# Patient Record
Sex: MALE | Race: WHITE
[De-identification: names, ages, dates, MRNs, and addresses within clinical notes are randomized per-mention and may not be internally consistent; named-entity substitution may affect disease eponyms.]

---

## 2021-12-11 ENCOUNTER — HOSPITAL ENCOUNTER (EMERGENCY)
Dept: HOSPITAL 11 - JP.ED | Age: 76
Discharge: HOME | End: 2021-12-11
Payer: MEDICARE

## 2021-12-11 VITALS — SYSTOLIC BLOOD PRESSURE: 149 MMHG | DIASTOLIC BLOOD PRESSURE: 92 MMHG | HEART RATE: 83 BPM

## 2021-12-11 DIAGNOSIS — D68.8: ICD-10-CM
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
## 2021-12-11 DIAGNOSIS — R53.1: ICD-10-CM

## 2021-12-11 DIAGNOSIS — Z88.8: ICD-10-CM

## 2021-12-11 DIAGNOSIS — Z79.01: ICD-10-CM

## 2021-12-11 DIAGNOSIS — S70.01XA: Primary | ICD-10-CM

## 2021-12-11 DIAGNOSIS — Z88.0: ICD-10-CM

## 2021-12-11 DIAGNOSIS — Z79.899: ICD-10-CM

## 2021-12-11 NOTE — EDM.PDOC
ED HPI GENERAL MEDICAL PROBLEM





- General


Chief Complaint: General


Stated Complaint: WEAKNESS


Time Seen by Provider: 12/11/21 09:50


Source of Information: Reports: Patient, Family


History Limitations: Reports: No Limitations





- History of Present Illness


INITIAL COMMENTS - FREE TEXT/NARRATIVE: 





76-year-old male who underwent a dental procedure 2 days ago, only with local 

anesthesia, who also has chronic Parkinson's is brought in today by his wife 

because of increased tremor, right hip discomfort after falling, and weakness.  

He also feels like his speech is not right and things just are not normal.  He 

did fall and bumped his head and is on Coumadin, his INR prior to the procedure 

was 3.7.  He ambulated several steps into the wheelchair to come in and look 

stable.  His vitals are normal.  He has no nausea and vomiting, no diarrhea, 

denies any pain other than his right hip.


Onset: Unknown/Unsure (Symptoms have kind of evolved over the past 2 days after 

his dental procedure)


Associated Symptoms: Reports: Confusion (Mild intermittent confusion), Headaches

(Mild intermittent headaches), Weakness (Especially lower extremities), Other 

(Increased tremor).  Denies: Cough, Fever/Chills, Nausea/Vomiting


  ** Right Hip


Pain Score (Numeric/FACES): 5





- Related Data


                                    Allergies











Allergy/AdvReac Type Severity Reaction Status Date / Time


 


Penicillins Allergy Intermediate Hives Verified 06/14/21 08:27


 


hydrochlorothiazide AdvReac  Cough Verified 06/14/21 08:27





[From Prinzide]     


 


lisinopril [From Prinzide] AdvReac  Cough Verified 06/14/21 08:27











Home Meds: 


                                    Home Meds





ARIPiprazole [Abilify] 15 mg PO BEDTIME 11/13/13 [History]


Omeprazole 20 mg PO DAILY 11/13/13 [History]


Warfarin Sliding Scale [Coumadin Sliding Scale] 8 mg PO BEDTIME 11/13/13 

[History]


Gabapentin [Neurontin] 600 mg PO BEDTIME 12/16/13 [History]


Naproxen 220 mg PO DAILY PRN 12/16/13 [History]


oxyCODONE HCl/Acetaminophen [oxyCODONE-Acetaminophen 5-325] 1 tab PO Q6H PRN 

12/16/13 [History]


Escitalopram Oxalate [Lexapro] 10 mg PO DAILY 06/14/21 [History]


NIFEdipine [Nifedipine ER] 60 mg PO DAILY 06/14/21 [History]


Carbidopa/Levodopa [Sinemet  mg Tablet]  12/11/21 [History]


Clindamycin HCl 300 mg PO 12/11/21 [History]


Pramipexole Di-HCl [Mirapex]  12/11/21 [History]











Social & Family History





- Tobacco Use


Tobacco Use Status *Q: Never Tobacco User


Second Hand Smoke Exposure: No





ED ROS GENERAL





- Review of Systems


Review Of Systems: See Below


Constitutional: Reports: Malaise.  Denies: Fever, Chills


HEENT: Denies: Vision Change


Respiratory: Denies: Shortness of Breath


Cardiovascular: Denies: Chest Pain


GI/Abdominal: Reports: No Symptoms


: Reports: No Symptoms


Musculoskeletal: Reports: Other (Right hip and groin pain after falling)


Skin: Denies: Bruising


Neurological: Reports: Confusion, Dizziness, Headache, Weakness, Other (Slight 

dysarthria or expressive aphasia with increased tremors)


Psychiatric: Reports: No Symptoms





ED EXAM, GENERAL





- Physical Exam


Exam: See Below


Exam Limited By: No Limitations


General Appearance: Alert, No Apparent Distress


Head: Atraumatic


Neck: Supple, Non-Tender


Respiratory/Chest: No Respiratory Distress, Lungs Clear


Cardiovascular: Regular Rate, Rhythm


GI/Abdominal: Soft, Non-Tender


Extremities: Other (Patient has some palpation tenderness over the lateral right

 hip and the right groin but minimal pain with passive range of motion including

 internal and external rotation.  He can actively flex his hip against gravity 

without much discomfort)


Neurological: Alert, Oriented, No Motor/Sensory Deficits


Psychiatric: Normal Affect, Normal Mood


Skin Exam: Warm, Dry





Course





- Vital Signs


Last Recorded V/S: 


                                Last Vital Signs











Temp  98.6 F   12/11/21 09:42


 


Pulse  83   12/11/21 11:14


 


Resp  16   12/11/21 09:42


 


BP  149/92 H  12/11/21 11:14


 


Pulse Ox  96   12/11/21 11:14














- Orders/Labs/Meds


Orders: 


                               Active Orders 24 hr











 Category Date Time Status


 


 Pelvis 1V or 2V [CR] Stat Exams  12/11/21 10:05 Taken











Labs: 


                                Laboratory Tests











  12/11/21 12/11/21 12/11/21 Range/Units





  10:05 10:55 10:55 


 


WBC   5.8   (4.5-11.0)  K/uL


 


RBC   5.41   (4.30-5.90)  M/uL


 


Hgb   16.3 H   (12.0-15.0)  g/dL


 


Hct   44.8   (40.0-54.0)  %


 


MCV   83   (80-98)  fL


 


MCH   30   (27-31)  pg


 


MCHC   36   (32-36)  %


 


Plt Count   112 L   (150-400)  K/uL


 


Neut % (Auto)   81.3 H   (36-66)  %


 


Lymph % (Auto)   7.9 L   (24-44)  %


 


Mono % (Auto)   9.6 H   (2-6)  %


 


Eos % (Auto)   0.7 L   (2-4)  %


 


Baso % (Auto)   0.5   (0-1)  %


 


PT    38.6 H  (9.2-10.6)  sec


 


INR    3.9  


 


Sodium     (140-148)  mmol/L


 


Potassium     (3.6-5.2)  mmol/L


 


Chloride     (100-108)  mmol/L


 


Carbon Dioxide     (21-32)  mmol/L


 


Anion Gap     (5.0-14.0)  mmol/L


 


BUN     (7-18)  mg/dL


 


Creatinine     (0.8-1.3)  mg/dL


 


Est Cr Clr Drug Dosing     mL/min


 


Estimated GFR (MDRD)     (>60)  


 


Glucose     ()  mg/dL


 


Calcium     (8.5-10.1)  mg/dL


 


Urine Color  Yellow    (YELLOW)  


 


Urine Appearance  Clear    (CLEAR)  


 


Urine pH  5.5    (5.0-8.0)  


 


Ur Specific Gravity  >= 1.030    (1.008-1.030)  


 


Urine Protein  100 H    (NEGATIVE)  mg/dL


 


Urine Glucose (UA)  500 H    (NEGATIVE)  mg/dL


 


Urine Ketones  15 H    (NEGATIVE)  mg/dL


 


Urine Occult Blood  Negative    (NEGATIVE)  


 


Urine Nitrite  Positive H    (NEGATIVE)  


 


Urine Bilirubin  Small H    (NEGATIVE)  


 


Urine Urobilinogen  0.2    (0.2-1.0)  EU/dL


 


Ur Leukocyte Esterase  Negative    (NEGATIVE)  


 


Urine RBC  Not seen    (0-5)  


 


Urine WBC  0-5    (0-5)  


 


Ur Epithelial Cells  Rare    


 


Amorphous Sediment  Few    


 


Urine Bacteria  Not seen    


 


Urine Mucus  Rare    














  12/11/21 Range/Units





  10:55 


 


WBC   (4.5-11.0)  K/uL


 


RBC   (4.30-5.90)  M/uL


 


Hgb   (12.0-15.0)  g/dL


 


Hct   (40.0-54.0)  %


 


MCV   (80-98)  fL


 


MCH   (27-31)  pg


 


MCHC   (32-36)  %


 


Plt Count   (150-400)  K/uL


 


Neut % (Auto)   (36-66)  %


 


Lymph % (Auto)   (24-44)  %


 


Mono % (Auto)   (2-6)  %


 


Eos % (Auto)   (2-4)  %


 


Baso % (Auto)   (0-1)  %


 


PT   (9.2-10.6)  sec


 


INR   


 


Sodium  132 L  (140-148)  mmol/L


 


Potassium  3.6  (3.6-5.2)  mmol/L


 


Chloride  97 L  (100-108)  mmol/L


 


Carbon Dioxide  28  (21-32)  mmol/L


 


Anion Gap  10.6  (5.0-14.0)  mmol/L


 


BUN  25 H  (7-18)  mg/dL


 


Creatinine  1.3  (0.8-1.3)  mg/dL


 


Est Cr Clr Drug Dosing  56.34  mL/min


 


Estimated GFR (MDRD)  54 L  (>60)  


 


Glucose  248 H  ()  mg/dL


 


Calcium  8.1 L  (8.5-10.1)  mg/dL


 


Urine Color   (YELLOW)  


 


Urine Appearance   (CLEAR)  


 


Urine pH   (5.0-8.0)  


 


Ur Specific Gravity   (1.008-1.030)  


 


Urine Protein   (NEGATIVE)  mg/dL


 


Urine Glucose (UA)   (NEGATIVE)  mg/dL


 


Urine Ketones   (NEGATIVE)  mg/dL


 


Urine Occult Blood   (NEGATIVE)  


 


Urine Nitrite   (NEGATIVE)  


 


Urine Bilirubin   (NEGATIVE)  


 


Urine Urobilinogen   (0.2-1.0)  EU/dL


 


Ur Leukocyte Esterase   (NEGATIVE)  


 


Urine RBC   (0-5)  


 


Urine WBC   (0-5)  


 


Ur Epithelial Cells   


 


Amorphous Sediment   


 


Urine Bacteria   


 


Urine Mucus   














- Re-Assessments/Exams


Free Text/Narrative Re-Assessment/Exam: 





12/11/21 11:09


Head CT and pelvic x-ray were obtained.  Pelvic x-ray is negative, he has a 

total hip on the right side.  He still presented as stable and neurologically 

intact.  Head CT was also normal as follows


IMPRESSION:


1. Negative for acute traumatic abnormality.


2. Mild nonspecific cerebral white matter disease which is most likely due to 

aging/chronic microvascular ischemia.





When I informed the patient and his wife that the above were normal, she had 

additional occurrence about his hydration status, his dark urine, and his abn

ormal INR.  CBC and BMP as well as INR were added, UA is pending.


12/11/21 13:08


UA showed no infection, CBC was normal but INR was still elevated at 3.9.  I 

recommended holding his Coumadin 1 night and then resuming his regular dose and 

rechecking on Monday.  Push fluids and stay hydrated, and return if issues such 

as bleeding or increased weakness.





Departure





- Departure


Time of Disposition: 11:51


Disposition: Home, Self-Care 01


Clinical Impression: 


 Generalized weakness, Supratherapeutic INR





Contusion, hip


Qualifiers:


 Encounter type: initial encounter Laterality: right Qualified Code(s): S70.01XA

 - Contusion of right hip, initial encounter








- Discharge Information


Instructions:  Weakness


Referrals: 


Zelalem Yanes MD [Primary Care Provider] - 


Forms:  ED Department Discharge


Care Plan Goals: 


Consider holding your Coumadin tonight and resume regular dosage tomorrow and 

recheck on Monday.  Return anytime if worsening such as active bleeding or 

increased weakness.  Stay hydrated.  Increase activity as tolerated.





Sepsis Event Note (ED)





- Focused Exam


Vital Signs: 


                                   Vital Signs











  Temp Pulse Resp BP Pulse Ox


 


 12/11/21 11:14   83   149/92 H  96


 


 12/11/21 10:14   83   123/74 


 


 12/11/21 09:42  98.6 F  86  16  142/85 H  97














- My Orders


Last 24 Hours: 


My Active Orders





12/11/21 10:05


Pelvis 1V or 2V [CR] Stat 














- Assessment/Plan


Last 24 Hours: 


My Active Orders





12/11/21 10:05


Pelvis 1V or 2V [CR] Stat

## 2021-12-11 NOTE — CRLCT
For Patients:  As a result of the 21st Century Cures Act, medical imaging 

exams and procedure reports are released immediately into your electronic 

medical record.  You may view this report before your referring provider.  

If you have questions, please contact your health care provider.



INDICATION:



Fell last night.



TECHNIQUE:



Scanning of the head was performed without IV contrast material. Coronal 

and sagittal reconstructions were obtained.



COMPARISON:



Head MRI of 01/26/2015



FINDINGS:



No acute hemorrhage or positive mass effect is demonstrated. 



No calvarial or obvious facial fracture is identified. The visualized 

paranasal and mastoid sinuses are clear.



The ventricles and other subarachnoid spaces are within normal limits for 

the patient`s age. 



There is mild nonspecific decreased attenuation in the cerebral white 

matter which is most likely due to aging/chronic microvascular ischemic 

disease.



IMPRESSION:



1. Negative for acute traumatic abnormality.



2. Mild nonspecific cerebral white matter disease which is most likely due 

to aging/chronic microvascular ischemia.



Please note that all CT scans at this facility use dose modulation, 

iterative reconstruction, and/or weight-based dosing when appropriate to 

reduce radiation dose to as low as reasonably achievable.



Dictated by Everette Arreguin MD @ 12/11/2021 11:06:10 AM



(Electronically Signed)

## 2021-12-13 NOTE — CR
Pelvis 1V or 2V

 

CLINICAL HISTORY: Pain, fall

 

FINDINGS: Patient has a right hip arthroplasty. Components appear well seated.

Bones are osteopenic. There is some osteoarthritis in the left hip

 

IMPRESSION: No fracture

 

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

 

Osteopenia

## 2022-02-19 ENCOUNTER — HOSPITAL ENCOUNTER (EMERGENCY)
Dept: HOSPITAL 11 - JP.ED | Age: 77
Discharge: HOME | End: 2022-02-19
Payer: MEDICARE

## 2022-02-19 VITALS — SYSTOLIC BLOOD PRESSURE: 126 MMHG | DIASTOLIC BLOOD PRESSURE: 69 MMHG

## 2022-02-19 VITALS — HEART RATE: 71 BPM

## 2022-02-19 DIAGNOSIS — G45.9: Primary | ICD-10-CM

## 2022-02-19 DIAGNOSIS — Z79.899: ICD-10-CM

## 2022-02-19 DIAGNOSIS — Z79.01: ICD-10-CM

## 2022-02-19 DIAGNOSIS — E11.9: ICD-10-CM

## 2022-02-19 DIAGNOSIS — Z88.0: ICD-10-CM

## 2022-02-19 DIAGNOSIS — Z88.8: ICD-10-CM

## 2022-02-19 PROCEDURE — 70498 CT ANGIOGRAPHY NECK: CPT

## 2022-02-19 PROCEDURE — 80053 COMPREHEN METABOLIC PANEL: CPT

## 2022-02-19 PROCEDURE — 83605 ASSAY OF LACTIC ACID: CPT

## 2022-02-19 PROCEDURE — 84484 ASSAY OF TROPONIN QUANT: CPT

## 2022-02-19 PROCEDURE — 93005 ELECTROCARDIOGRAM TRACING: CPT

## 2022-02-19 PROCEDURE — 70450 CT HEAD/BRAIN W/O DYE: CPT

## 2022-02-19 PROCEDURE — 70496 CT ANGIOGRAPHY HEAD: CPT

## 2022-02-19 PROCEDURE — 85610 PROTHROMBIN TIME: CPT

## 2022-02-19 PROCEDURE — 99284 EMERGENCY DEPT VISIT MOD MDM: CPT

## 2022-02-19 PROCEDURE — 85025 COMPLETE CBC W/AUTO DIFF WBC: CPT

## 2022-02-19 PROCEDURE — 36415 COLL VENOUS BLD VENIPUNCTURE: CPT

## 2022-10-09 ENCOUNTER — HOSPITAL ENCOUNTER (EMERGENCY)
Dept: HOSPITAL 11 - JP.ED | Age: 77
Discharge: HOME | End: 2022-10-09
Payer: MEDICARE

## 2022-10-09 DIAGNOSIS — S61.452A: Primary | ICD-10-CM
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
## 2023-03-09 ENCOUNTER — HOSPITAL ENCOUNTER (OUTPATIENT)
Dept: HOSPITAL 11 - JP.SDS | Age: 78
Discharge: HOME | End: 2023-03-09
Attending: SURGERY
Payer: MEDICARE

## 2023-03-09 VITALS — SYSTOLIC BLOOD PRESSURE: 132 MMHG | DIASTOLIC BLOOD PRESSURE: 78 MMHG | HEART RATE: 72 BPM

## 2023-03-09 DIAGNOSIS — K57.30: ICD-10-CM

## 2023-03-09 DIAGNOSIS — I26.99: ICD-10-CM

## 2023-03-09 DIAGNOSIS — F43.10: ICD-10-CM

## 2023-03-09 DIAGNOSIS — Z12.11: Primary | ICD-10-CM

## 2023-03-09 DIAGNOSIS — E11.9: ICD-10-CM

## 2023-03-09 DIAGNOSIS — Z88.8: ICD-10-CM

## 2023-03-09 DIAGNOSIS — I71.40: ICD-10-CM

## 2023-03-09 DIAGNOSIS — G47.33: ICD-10-CM

## 2023-03-09 DIAGNOSIS — Z86.010: ICD-10-CM

## 2023-03-09 DIAGNOSIS — K64.9: ICD-10-CM

## 2023-03-09 PROCEDURE — 36415 COLL VENOUS BLD VENIPUNCTURE: CPT

## 2023-03-09 PROCEDURE — 82947 ASSAY GLUCOSE BLOOD QUANT: CPT

## 2023-03-09 PROCEDURE — 85610 PROTHROMBIN TIME: CPT

## 2023-05-15 ENCOUNTER — HOSPITAL ENCOUNTER (EMERGENCY)
Dept: HOSPITAL 11 - JP.ED | Age: 78
Discharge: HOME | End: 2023-05-15
Payer: MEDICARE

## 2023-05-15 VITALS — DIASTOLIC BLOOD PRESSURE: 79 MMHG | SYSTOLIC BLOOD PRESSURE: 124 MMHG | HEART RATE: 80 BPM

## 2023-05-15 DIAGNOSIS — E11.40: ICD-10-CM

## 2023-05-15 DIAGNOSIS — Z88.8: ICD-10-CM

## 2023-05-15 DIAGNOSIS — Z88.0: ICD-10-CM

## 2023-05-15 DIAGNOSIS — M48.061: Primary | ICD-10-CM

## 2023-05-15 DIAGNOSIS — Z86.16: ICD-10-CM

## 2023-05-15 DIAGNOSIS — Z79.899: ICD-10-CM

## 2023-05-15 DIAGNOSIS — K21.9: ICD-10-CM

## 2023-05-15 DIAGNOSIS — E78.00: ICD-10-CM

## 2023-05-15 DIAGNOSIS — Z79.82: ICD-10-CM

## 2023-05-15 DIAGNOSIS — Z79.01: ICD-10-CM

## 2023-05-15 DIAGNOSIS — I10: ICD-10-CM

## 2023-05-15 DIAGNOSIS — M25.551: ICD-10-CM

## 2023-05-15 LAB
ALBUMIN SERPL-MCNC: 3.8 G/DL (ref 3.4–5)
ALBUMIN/GLOB SERPL: 1.3 {RATIO} (ref 1.2–2.2)
ALP SERPL-CCNC: 72 U/L (ref 46–116)
ALT SERPL-CCNC: 21 U/L (ref 12–78)
ANION GAP SERPL CALC-SCNC: 13 MMOL/L (ref 5–14)
AST SERPL-CCNC: 25 U/L (ref 15–37)
BASOPHILS # BLD AUTO: 0.04 K/UL (ref 0–0.1)
BASOPHILS NFR BLD AUTO: 0.4 % (ref 0.1–1.3)
BILIRUB SERPL-MCNC: 0.7 MG/DL (ref 0.2–1)
BUN SERPL-MCNC: 18 MG/DL (ref 7–18)
CALCIUM SERPL-MCNC: 8.9 MG/DL (ref 8.5–10.1)
CHLORIDE SERPL-SCNC: 100 MMOL/L (ref 100–108)
CO2 SERPL-SCNC: 30 MMOL/L (ref 21–32)
CREAT CL 24H UR+SERPL-VRATE: 57.48 ML/MIN
CREAT SERPL-MCNC: 1.2 MG/DL (ref 0.8–1.3)
EOSINOPHIL # BLD AUTO: 0.2 K/UL (ref 0–0.4)
EOSINOPHIL NFR BLD AUTO: 2.1 % (ref 0–5.4)
GLOBULIN SER-MCNC: 2.9 G/DL (ref 2.3–3.5)
GLUCOSE SERPL-MCNC: 159 MG/DL (ref 74–106)
HCT VFR BLD AUTO: 48.3 % (ref 38.4–49.7)
HGB BLD-MCNC: 17.4 G/DL (ref 12.9–16.9)
IMM GRANULOCYTES # BLD: 0.02 K/UL (ref 0–0.23)
IMM GRANULOCYTES NFR BLD: 0.2 % (ref 0–0.7)
INR PPP: 5
LYMPHOCYTES # BLD AUTO: 1.47 K/UL (ref 0.8–3.3)
LYMPHOCYTES NFR BLD AUTO: 15.3 % (ref 11.4–47.7)
MCH RBC QN AUTO: 29.7 PG (ref 31.6–35.5)
MCHC RBC AUTO-ENTMCNC: 36 G/DL (ref 31.6–35.5)
MCHC RBC AUTO-ENTMCNC: 82.6 FL (ref 81.4–99)
MONOCYTES # BLD AUTO: 0.64 K/UL (ref 0.2–0.9)
MONOCYTES NFR BLD AUTO: 6.7 % (ref 3.3–12.6)
NEUTROPHILS # BLD AUTO: 7.24 K/UL (ref 1–7.6)
NEUTROPHILS NFR BLD AUTO: 75.3 % (ref 40–78.1)
PLATELET # BLD AUTO: 184 K/UL (ref 130–375)
POTASSIUM SERPL-SCNC: 4 MMOL/L (ref 3.6–5.2)
PROT SERPL-MCNC: 6.7 G/DL (ref 6.4–8.2)
PROTHROMBIN TIME: 45.6 SEC (ref 9.2–10.6)
RBC # BLD AUTO: 5.85 M/UL (ref 4.14–5.76)
SODIUM SERPL-SCNC: 139 MMOL/L (ref 140–148)
WBC # BLD AUTO: 9.6 K/UL (ref 3.2–11)

## 2023-05-15 PROCEDURE — 86140 C-REACTIVE PROTEIN: CPT

## 2023-05-15 PROCEDURE — 99283 EMERGENCY DEPT VISIT LOW MDM: CPT

## 2023-05-15 PROCEDURE — 76377 3D RENDER W/INTRP POSTPROCES: CPT

## 2023-05-15 PROCEDURE — 36415 COLL VENOUS BLD VENIPUNCTURE: CPT

## 2023-05-15 PROCEDURE — 96372 THER/PROPH/DIAG INJ SC/IM: CPT

## 2023-05-15 PROCEDURE — 85610 PROTHROMBIN TIME: CPT

## 2023-05-15 PROCEDURE — 80053 COMPREHEN METABOLIC PANEL: CPT

## 2023-05-15 PROCEDURE — 72131 CT LUMBAR SPINE W/O DYE: CPT

## 2023-05-15 PROCEDURE — 73700 CT LOWER EXTREMITY W/O DYE: CPT

## 2023-05-15 PROCEDURE — 85025 COMPLETE CBC W/AUTO DIFF WBC: CPT

## 2023-09-03 ENCOUNTER — HOSPITAL ENCOUNTER (EMERGENCY)
Dept: HOSPITAL 11 - JP.ED | Age: 78
Discharge: HOME | End: 2023-09-03
Payer: MEDICARE

## 2023-09-03 VITALS — DIASTOLIC BLOOD PRESSURE: 88 MMHG | SYSTOLIC BLOOD PRESSURE: 141 MMHG | HEART RATE: 78 BPM

## 2023-09-03 DIAGNOSIS — M48.061: ICD-10-CM

## 2023-09-03 DIAGNOSIS — M54.6: Primary | ICD-10-CM

## 2023-09-03 DIAGNOSIS — Z88.0: ICD-10-CM
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
## 2023-09-03 DIAGNOSIS — Z79.01: ICD-10-CM

## 2023-09-03 DIAGNOSIS — Z86.73: ICD-10-CM

## 2023-09-03 DIAGNOSIS — G20: ICD-10-CM

## 2023-09-03 DIAGNOSIS — Z86.16: ICD-10-CM

## 2023-09-03 DIAGNOSIS — E04.1: ICD-10-CM

## 2023-09-03 DIAGNOSIS — Z79.899: ICD-10-CM

## 2023-09-03 DIAGNOSIS — K21.9: ICD-10-CM

## 2023-09-03 DIAGNOSIS — Z88.8: ICD-10-CM

## 2023-09-03 DIAGNOSIS — E11.21: ICD-10-CM

## 2023-09-03 LAB
ANION GAP SERPL CALC-SCNC: 8.2 MMOL/L (ref 5–14)
BASOPHILS # BLD AUTO: 0.07 K/UL (ref 0–0.1)
BASOPHILS NFR BLD AUTO: 0.7 % (ref 0.1–1.3)
BUN SERPL-MCNC: 13 MG/DL (ref 7–18)
CALCIUM SERPL-MCNC: 8.7 MG/DL (ref 8.5–10.1)
CHLORIDE SERPL-SCNC: 101 MMOL/L (ref 100–108)
CO2 SERPL-SCNC: 32 MMOL/L (ref 21–32)
CREAT CL 24H UR+SERPL-VRATE: 71.02 ML/MIN
CREAT SERPL-MCNC: 1 MG/DL (ref 0.8–1.3)
EOSINOPHIL # BLD AUTO: 0.3 K/UL (ref 0–0.4)
EOSINOPHIL NFR BLD AUTO: 3.2 % (ref 0–5.4)
GLUCOSE SERPL-MCNC: 128 MG/DL (ref 74–106)
HCT VFR BLD AUTO: 47.5 % (ref 38.4–49.7)
HGB BLD-MCNC: 17.3 G/DL (ref 12.9–16.9)
IMM GRANULOCYTES # BLD: 0.04 K/UL (ref 0–0.23)
IMM GRANULOCYTES NFR BLD: 0.4 % (ref 0–0.7)
INR PPP: 2.8
LYMPHOCYTES # BLD AUTO: 1.68 K/UL (ref 0.8–3.3)
LYMPHOCYTES NFR BLD AUTO: 17.9 % (ref 11.4–47.7)
MCH RBC QN AUTO: 30 PG (ref 31.6–35.5)
MCHC RBC AUTO-ENTMCNC: 36.4 G/DL (ref 31.6–35.5)
MCHC RBC AUTO-ENTMCNC: 82.3 FL (ref 81.4–99)
MONOCYTES # BLD AUTO: 0.6 K/UL (ref 0.2–0.9)
MONOCYTES NFR BLD AUTO: 6.4 % (ref 3.3–12.6)
NEUTROPHILS # BLD AUTO: 6.69 K/UL (ref 1–7.6)
NEUTROPHILS NFR BLD AUTO: 71.4 % (ref 40–78.1)
PLATELET # BLD AUTO: 211 K/UL (ref 130–375)
POTASSIUM SERPL-SCNC: 3.8 MMOL/L (ref 3.6–5.2)
PROTHROMBIN TIME: 26.9 SEC (ref 9.2–10.6)
RBC # BLD AUTO: 5.77 M/UL (ref 4.14–5.76)
SODIUM SERPL-SCNC: 141 MMOL/L (ref 140–148)
WBC # BLD AUTO: 9.4 K/UL (ref 3.2–11)

## 2023-09-03 PROCEDURE — 76377 3D RENDER W/INTRP POSTPROCES: CPT

## 2023-09-03 PROCEDURE — 70450 CT HEAD/BRAIN W/O DYE: CPT

## 2023-09-03 PROCEDURE — 36415 COLL VENOUS BLD VENIPUNCTURE: CPT

## 2023-09-03 PROCEDURE — 72125 CT NECK SPINE W/O DYE: CPT

## 2023-09-03 PROCEDURE — 85025 COMPLETE CBC W/AUTO DIFF WBC: CPT

## 2023-09-03 PROCEDURE — 85610 PROTHROMBIN TIME: CPT

## 2023-09-03 PROCEDURE — 80048 BASIC METABOLIC PNL TOTAL CA: CPT

## 2023-09-03 PROCEDURE — 71260 CT THORAX DX C+: CPT

## 2023-09-03 PROCEDURE — 86901 BLOOD TYPING SEROLOGIC RH(D): CPT

## 2023-09-03 PROCEDURE — 86900 BLOOD TYPING SEROLOGIC ABO: CPT

## 2023-09-03 PROCEDURE — 96374 THER/PROPH/DIAG INJ IV PUSH: CPT

## 2023-09-03 PROCEDURE — 86850 RBC ANTIBODY SCREEN: CPT

## 2023-09-03 PROCEDURE — 99284 EMERGENCY DEPT VISIT MOD MDM: CPT

## 2024-12-11 ENCOUNTER — HOSPITAL ENCOUNTER (EMERGENCY)
Dept: HOSPITAL 11 - JP.ED | Age: 79
Discharge: NURSING, REHAB OR LONG-TERM CARE | End: 2024-12-11
Payer: MEDICARE

## 2024-12-11 VITALS — HEART RATE: 81 BPM | DIASTOLIC BLOOD PRESSURE: 111 MMHG | SYSTOLIC BLOOD PRESSURE: 185 MMHG

## 2024-12-11 DIAGNOSIS — Z96.659: ICD-10-CM

## 2024-12-11 DIAGNOSIS — Z86.16: ICD-10-CM

## 2024-12-11 DIAGNOSIS — Z86.73: ICD-10-CM

## 2024-12-11 DIAGNOSIS — Z79.82: ICD-10-CM

## 2024-12-11 DIAGNOSIS — R47.1: Primary | ICD-10-CM

## 2024-12-11 DIAGNOSIS — R27.0: ICD-10-CM

## 2024-12-11 DIAGNOSIS — Z79.01: ICD-10-CM

## 2024-12-11 DIAGNOSIS — Z88.0: ICD-10-CM

## 2024-12-11 DIAGNOSIS — Z96.649: ICD-10-CM

## 2024-12-11 DIAGNOSIS — I10: ICD-10-CM

## 2024-12-11 DIAGNOSIS — Z79.899: ICD-10-CM

## 2024-12-11 DIAGNOSIS — K21.9: ICD-10-CM

## 2024-12-11 DIAGNOSIS — Z79.84: ICD-10-CM

## 2024-12-11 DIAGNOSIS — E11.42: ICD-10-CM

## 2024-12-11 DIAGNOSIS — Z88.8: ICD-10-CM

## 2024-12-11 LAB
ANION GAP SERPL CALC-SCNC: 8.9 MMOL/L (ref 5–14)
APTT PPP: 32.8 SEC (ref 21.8–27.3)
BASOPHILS # BLD AUTO: 0.04 K/UL (ref 0–0.1)
BASOPHILS NFR BLD AUTO: 0.5 % (ref 0.1–1.3)
BUN SERPL-MCNC: 18 MG/DL (ref 7–18)
CALCIUM SERPL-MCNC: 8.8 MG/DL (ref 8.5–10.1)
CHLORIDE SERPL-SCNC: 104 MMOL/L (ref 100–108)
CO2 SERPL-SCNC: 28 MMOL/L (ref 21–32)
CREAT CL 24H UR+SERPL-VRATE: 61.73 ML/MIN
CREAT SERPL-MCNC: 1.1 MG/DL (ref 0.8–1.3)
EOSINOPHIL # BLD AUTO: 0.26 K/UL (ref 0–0.4)
EOSINOPHIL NFR BLD AUTO: 3 % (ref 0–5.4)
GLUCOSE SERPL-MCNC: 109 MG/DL (ref 74–106)
HCT VFR BLD AUTO: 44.7 % (ref 38.4–49.7)
HGB BLD-MCNC: 15 G/DL (ref 12.9–16.9)
IMM GRANULOCYTES # BLD: 0.02 K/UL (ref 0–0.23)
IMM GRANULOCYTES NFR BLD: 0.2 % (ref 0–0.7)
INR PPP: 2.1
LYMPHOCYTES # BLD AUTO: 1.62 K/UL (ref 0.8–3.3)
LYMPHOCYTES NFR BLD AUTO: 18.8 % (ref 11.4–47.7)
MCH RBC QN AUTO: 25.7 PG (ref 31.6–35.5)
MCHC RBC AUTO-ENTMCNC: 33.6 G/DL (ref 31.6–35.5)
MCHC RBC AUTO-ENTMCNC: 76.7 FL (ref 81.4–99)
MONOCYTES # BLD AUTO: 0.48 K/UL (ref 0.2–0.9)
MONOCYTES NFR BLD AUTO: 5.6 % (ref 3.3–12.6)
NEUTROPHILS # BLD AUTO: 6.22 K/UL (ref 1–7.6)
NEUTROPHILS NFR BLD AUTO: 71.9 % (ref 40–78.1)
PLATELET # BLD AUTO: 177 K/UL (ref 130–375)
POTASSIUM SERPL-SCNC: 4 MMOL/L (ref 3.6–5.2)
PROTHROMBIN TIME: 20.6 SEC (ref 9.2–10.6)
RBC # BLD AUTO: 5.83 M/UL (ref 4.14–5.76)
SODIUM SERPL-SCNC: 141 MMOL/L (ref 140–148)
TROPONIN I SERPL HS-MCNC: 20.5 PG/ML (ref ?–60.3)
WBC # BLD AUTO: 8.6 K/UL (ref 3.2–11)

## 2024-12-11 PROCEDURE — 93010 ELECTROCARDIOGRAM REPORT: CPT

## 2024-12-11 PROCEDURE — 80048 BASIC METABOLIC PNL TOTAL CA: CPT

## 2024-12-11 PROCEDURE — 84484 ASSAY OF TROPONIN QUANT: CPT

## 2024-12-11 PROCEDURE — 99285 EMERGENCY DEPT VISIT HI MDM: CPT

## 2024-12-11 PROCEDURE — 85025 COMPLETE CBC W/AUTO DIFF WBC: CPT

## 2024-12-11 PROCEDURE — 96360 HYDRATION IV INFUSION INIT: CPT

## 2024-12-11 PROCEDURE — 85730 THROMBOPLASTIN TIME PARTIAL: CPT

## 2024-12-11 PROCEDURE — 93005 ELECTROCARDIOGRAM TRACING: CPT

## 2024-12-11 PROCEDURE — 36415 COLL VENOUS BLD VENIPUNCTURE: CPT

## 2024-12-11 PROCEDURE — 70498 CT ANGIOGRAPHY NECK: CPT

## 2024-12-11 PROCEDURE — 70496 CT ANGIOGRAPHY HEAD: CPT

## 2024-12-11 PROCEDURE — 70450 CT HEAD/BRAIN W/O DYE: CPT

## 2024-12-11 PROCEDURE — 82947 ASSAY GLUCOSE BLOOD QUANT: CPT

## 2024-12-11 PROCEDURE — 96361 HYDRATE IV INFUSION ADD-ON: CPT

## 2024-12-11 PROCEDURE — 85610 PROTHROMBIN TIME: CPT

## 2024-12-11 RX ADMIN — Medication ONE ML: at 14:51

## 2024-12-11 RX ADMIN — IOPAMIDOL SCH ML: 755 INJECTION, SOLUTION INTRAVENOUS at 14:50

## 2024-12-14 ENCOUNTER — HOSPITAL ENCOUNTER (EMERGENCY)
Dept: HOSPITAL 11 - JP.ED | Age: 79
LOS: 1 days | Discharge: HOME | End: 2024-12-15
Payer: MEDICARE

## 2024-12-14 DIAGNOSIS — Z79.84: ICD-10-CM

## 2024-12-14 DIAGNOSIS — Z79.891: ICD-10-CM

## 2024-12-14 DIAGNOSIS — K21.9: ICD-10-CM

## 2024-12-14 DIAGNOSIS — Z88.0: ICD-10-CM

## 2024-12-14 DIAGNOSIS — Z88.8: ICD-10-CM

## 2024-12-14 DIAGNOSIS — E78.00: ICD-10-CM

## 2024-12-14 DIAGNOSIS — G45.9: Primary | ICD-10-CM

## 2024-12-14 DIAGNOSIS — Z79.899: ICD-10-CM

## 2024-12-14 DIAGNOSIS — E11.9: ICD-10-CM

## 2024-12-14 DIAGNOSIS — Z86.16: ICD-10-CM

## 2024-12-14 DIAGNOSIS — I10: ICD-10-CM

## 2024-12-14 DIAGNOSIS — Z88.5: ICD-10-CM

## 2024-12-14 PROCEDURE — 93005 ELECTROCARDIOGRAM TRACING: CPT

## 2024-12-14 PROCEDURE — 70498 CT ANGIOGRAPHY NECK: CPT

## 2024-12-14 PROCEDURE — 70450 CT HEAD/BRAIN W/O DYE: CPT

## 2024-12-14 PROCEDURE — 85025 COMPLETE CBC W/AUTO DIFF WBC: CPT

## 2024-12-14 PROCEDURE — 99285 EMERGENCY DEPT VISIT HI MDM: CPT

## 2024-12-14 PROCEDURE — 80048 BASIC METABOLIC PNL TOTAL CA: CPT

## 2024-12-14 PROCEDURE — 84484 ASSAY OF TROPONIN QUANT: CPT

## 2024-12-14 PROCEDURE — 85610 PROTHROMBIN TIME: CPT

## 2024-12-14 PROCEDURE — 70496 CT ANGIOGRAPHY HEAD: CPT

## 2024-12-14 PROCEDURE — 36415 COLL VENOUS BLD VENIPUNCTURE: CPT

## 2024-12-15 VITALS — HEART RATE: 71 BPM | SYSTOLIC BLOOD PRESSURE: 167 MMHG | DIASTOLIC BLOOD PRESSURE: 88 MMHG

## 2024-12-15 LAB
ANION GAP SERPL CALC-SCNC: 6.2 MMOL/L (ref 5–14)
BASOPHILS # BLD AUTO: 0.05 K/UL (ref 0–0.1)
BASOPHILS NFR BLD AUTO: 0.7 % (ref 0.1–1.3)
BUN SERPL-MCNC: 18 MG/DL (ref 7–18)
CALCIUM SERPL-MCNC: 7.4 MG/DL (ref 8.5–10.1)
CHLORIDE SERPL-SCNC: 104 MMOL/L (ref 100–108)
CO2 SERPL-SCNC: 31 MMOL/L (ref 21–32)
CREAT CL 24H UR+SERPL-VRATE: 63.56 ML/MIN
CREAT SERPL-MCNC: 1.1 MG/DL (ref 0.8–1.3)
EOSINOPHIL # BLD AUTO: 0.36 K/UL (ref 0–0.4)
EOSINOPHIL NFR BLD AUTO: 4.9 % (ref 0–5.4)
GLUCOSE SERPL-MCNC: 124 MG/DL (ref 74–106)
HCT VFR BLD AUTO: 37.8 % (ref 38.4–49.7)
HGB BLD-MCNC: 12.9 G/DL (ref 12.9–16.9)
IMM GRANULOCYTES # BLD: 0.02 K/UL (ref 0–0.23)
IMM GRANULOCYTES NFR BLD: 0.3 % (ref 0–0.7)
INR PPP: 2.4
LYMPHOCYTES # BLD AUTO: 1.56 K/UL (ref 0.8–3.3)
LYMPHOCYTES NFR BLD AUTO: 21.4 % (ref 11.4–47.7)
MCH RBC QN AUTO: 26.3 PG (ref 31.6–35.5)
MCHC RBC AUTO-ENTMCNC: 34.1 G/DL (ref 31.6–35.5)
MCHC RBC AUTO-ENTMCNC: 77 FL (ref 81.4–99)
MONOCYTES # BLD AUTO: 0.59 K/UL (ref 0.2–0.9)
MONOCYTES NFR BLD AUTO: 8.1 % (ref 3.3–12.6)
NEUTROPHILS # BLD AUTO: 4.72 K/UL (ref 1–7.6)
NEUTROPHILS NFR BLD AUTO: 64.6 % (ref 40–78.1)
PLATELET # BLD AUTO: 134 K/UL (ref 130–375)
POTASSIUM SERPL-SCNC: 3.6 MMOL/L (ref 3.6–5.2)
PROTHROMBIN TIME: 23.7 SEC (ref 9.2–10.6)
RBC # BLD AUTO: 4.91 M/UL (ref 4.14–5.76)
SODIUM SERPL-SCNC: 141 MMOL/L (ref 140–148)
TROPONIN I SERPL HS-MCNC: 22.1 PG/ML (ref ?–60.3)
WBC # BLD AUTO: 7.3 K/UL (ref 3.2–11)

## 2024-12-15 RX ADMIN — Medication PRN ML: at 00:33

## 2024-12-15 RX ADMIN — IOPAMIDOL SCH ML: 755 INJECTION, SOLUTION INTRAVENOUS at 00:33

## 2025-02-18 ENCOUNTER — HOSPITAL ENCOUNTER (EMERGENCY)
Dept: HOSPITAL 11 - JP.ED | Age: 80
Discharge: HOME | End: 2025-02-18
Payer: MEDICARE

## 2025-02-18 VITALS — HEART RATE: 74 BPM | DIASTOLIC BLOOD PRESSURE: 87 MMHG | SYSTOLIC BLOOD PRESSURE: 144 MMHG

## 2025-02-18 DIAGNOSIS — E78.00: ICD-10-CM

## 2025-02-18 DIAGNOSIS — Z88.6: ICD-10-CM

## 2025-02-18 DIAGNOSIS — K21.9: ICD-10-CM

## 2025-02-18 DIAGNOSIS — R79.1: ICD-10-CM

## 2025-02-18 DIAGNOSIS — Z79.84: ICD-10-CM

## 2025-02-18 DIAGNOSIS — R04.0: Primary | ICD-10-CM

## 2025-02-18 DIAGNOSIS — Z79.891: ICD-10-CM

## 2025-02-18 DIAGNOSIS — E11.9: ICD-10-CM

## 2025-02-18 DIAGNOSIS — M54.50: ICD-10-CM

## 2025-02-18 DIAGNOSIS — Z88.8: ICD-10-CM

## 2025-02-18 DIAGNOSIS — Z79.899: ICD-10-CM

## 2025-02-18 DIAGNOSIS — I10: ICD-10-CM

## 2025-02-18 DIAGNOSIS — Z79.82: ICD-10-CM

## 2025-02-18 DIAGNOSIS — Z88.0: ICD-10-CM

## 2025-02-18 DIAGNOSIS — Z86.16: ICD-10-CM

## 2025-02-18 LAB
ANION GAP SERPL CALC-SCNC: 10.5 MMOL/L (ref 5–14)
BASOPHILS # BLD AUTO: 0.05 K/UL (ref 0–0.1)
BASOPHILS NFR BLD AUTO: 0.3 % (ref 0.1–1.3)
BUN SERPL-MCNC: 26 MG/DL (ref 7–18)
CALCIUM SERPL-MCNC: 8 MG/DL (ref 8.5–10.1)
CHLORIDE SERPL-SCNC: 104 MMOL/L (ref 100–108)
CO2 SERPL-SCNC: 29 MMOL/L (ref 21–32)
CREAT CL 24H UR+SERPL-VRATE: 52.93 ML/MIN
CREAT SERPL-MCNC: 1.3 MG/DL (ref 0.8–1.3)
EOSINOPHIL # BLD AUTO: 0.1 K/UL (ref 0–0.4)
EOSINOPHIL NFR BLD AUTO: 0.7 % (ref 0–5.4)
GLUCOSE SERPL-MCNC: 233 MG/DL (ref 74–106)
HCT VFR BLD AUTO: 40.7 % (ref 38.4–49.7)
HGB BLD-MCNC: 13.5 G/DL (ref 12.9–16.9)
IMM GRANULOCYTES # BLD: 0.07 K/UL (ref 0–0.23)
IMM GRANULOCYTES NFR BLD: 0.5 % (ref 0–0.7)
INR PPP: 4.1
LYMPHOCYTES # BLD AUTO: 1.32 K/UL (ref 0.8–3.3)
LYMPHOCYTES NFR BLD AUTO: 8.9 % (ref 11.4–47.7)
MCH RBC QN AUTO: 26.9 PG (ref 31.6–35.5)
MCHC RBC AUTO-ENTMCNC: 33.2 G/DL (ref 31.6–35.5)
MCHC RBC AUTO-ENTMCNC: 81.1 FL (ref 81.4–99)
MONOCYTES # BLD AUTO: 0.73 K/UL (ref 0.2–0.9)
MONOCYTES NFR BLD AUTO: 4.9 % (ref 3.3–12.6)
NEUTROPHILS # BLD AUTO: 12.62 K/UL (ref 1–7.6)
NEUTROPHILS NFR BLD AUTO: 84.7 % (ref 40–78.1)
PLATELET # BLD AUTO: 196 K/UL (ref 130–375)
POTASSIUM SERPL-SCNC: 4.5 MMOL/L (ref 3.6–5.2)
PROTHROMBIN TIME: 39.8 SEC (ref 9.2–10.6)
RBC # BLD AUTO: 5.02 M/UL (ref 4.14–5.76)
SODIUM SERPL-SCNC: 139 MMOL/L (ref 140–148)
WBC # BLD AUTO: 14.9 K/UL (ref 3.2–11)

## 2025-02-18 PROCEDURE — 30901 CONTROL OF NOSEBLEED: CPT

## 2025-02-18 PROCEDURE — 72220 X-RAY EXAM SACRUM TAILBONE: CPT

## 2025-02-18 PROCEDURE — 72100 X-RAY EXAM L-S SPINE 2/3 VWS: CPT

## 2025-02-18 PROCEDURE — 80048 BASIC METABOLIC PNL TOTAL CA: CPT

## 2025-02-18 PROCEDURE — 85610 PROTHROMBIN TIME: CPT

## 2025-02-18 PROCEDURE — 99284 EMERGENCY DEPT VISIT MOD MDM: CPT

## 2025-02-18 PROCEDURE — 36415 COLL VENOUS BLD VENIPUNCTURE: CPT

## 2025-02-18 PROCEDURE — 85025 COMPLETE CBC W/AUTO DIFF WBC: CPT

## 2025-02-18 RX ADMIN — HYDROCODONE BITARTRATE AND ACETAMINOPHEN ONE TAB: 5; 325 TABLET ORAL at 12:21

## 2025-02-25 ENCOUNTER — HOSPITAL ENCOUNTER (EMERGENCY)
Dept: HOSPITAL 11 - JP.ED | Age: 80
Discharge: HOME | End: 2025-02-25
Payer: MEDICARE

## 2025-02-25 VITALS — DIASTOLIC BLOOD PRESSURE: 73 MMHG | SYSTOLIC BLOOD PRESSURE: 126 MMHG | HEART RATE: 76 BPM

## 2025-02-25 DIAGNOSIS — Z88.8: ICD-10-CM

## 2025-02-25 DIAGNOSIS — Z88.5: ICD-10-CM

## 2025-02-25 DIAGNOSIS — E11.40: ICD-10-CM

## 2025-02-25 DIAGNOSIS — R04.0: Primary | ICD-10-CM

## 2025-02-25 DIAGNOSIS — Z86.73: ICD-10-CM

## 2025-02-25 DIAGNOSIS — I10: ICD-10-CM

## 2025-02-25 DIAGNOSIS — Z79.84: ICD-10-CM

## 2025-02-25 DIAGNOSIS — Z79.01: ICD-10-CM

## 2025-02-25 DIAGNOSIS — Z79.899: ICD-10-CM

## 2025-02-25 DIAGNOSIS — E78.00: ICD-10-CM

## 2025-02-25 DIAGNOSIS — Z88.0: ICD-10-CM

## 2025-02-25 LAB
INR PPP: 2.6
PROTHROMBIN TIME: 25.2 SEC (ref 9.2–10.6)

## 2025-07-27 ENCOUNTER — HOSPITAL ENCOUNTER (INPATIENT)
Dept: HOSPITAL 11 - JP.ED | Age: 80
LOS: 2 days | Discharge: HOME | DRG: 69 | End: 2025-07-29
Attending: INTERNAL MEDICINE
Payer: MEDICARE

## 2025-07-27 DIAGNOSIS — Z88.1: ICD-10-CM

## 2025-07-27 DIAGNOSIS — F41.9: ICD-10-CM

## 2025-07-27 DIAGNOSIS — Z79.84: ICD-10-CM

## 2025-07-27 DIAGNOSIS — Z85.820: ICD-10-CM

## 2025-07-27 DIAGNOSIS — M54.9: ICD-10-CM

## 2025-07-27 DIAGNOSIS — E11.42: ICD-10-CM

## 2025-07-27 DIAGNOSIS — G47.30: ICD-10-CM

## 2025-07-27 DIAGNOSIS — Z90.89: ICD-10-CM

## 2025-07-27 DIAGNOSIS — E78.00: ICD-10-CM

## 2025-07-27 DIAGNOSIS — F43.10: ICD-10-CM

## 2025-07-27 DIAGNOSIS — I10: ICD-10-CM

## 2025-07-27 DIAGNOSIS — E87.20: ICD-10-CM

## 2025-07-27 DIAGNOSIS — Z86.73: ICD-10-CM

## 2025-07-27 DIAGNOSIS — Z88.0: ICD-10-CM

## 2025-07-27 DIAGNOSIS — Z98.890: ICD-10-CM

## 2025-07-27 DIAGNOSIS — Z86.711: ICD-10-CM

## 2025-07-27 DIAGNOSIS — Z87.891: ICD-10-CM

## 2025-07-27 DIAGNOSIS — E11.40: ICD-10-CM

## 2025-07-27 DIAGNOSIS — Z88.8: ICD-10-CM

## 2025-07-27 DIAGNOSIS — Z96.659: ICD-10-CM

## 2025-07-27 DIAGNOSIS — G89.29: ICD-10-CM

## 2025-07-27 DIAGNOSIS — Z96.649: ICD-10-CM

## 2025-07-27 DIAGNOSIS — G45.9: ICD-10-CM

## 2025-07-27 DIAGNOSIS — Z79.899: ICD-10-CM

## 2025-07-27 DIAGNOSIS — K21.9: ICD-10-CM

## 2025-07-27 DIAGNOSIS — I71.40: ICD-10-CM

## 2025-07-27 DIAGNOSIS — R29.810: Primary | ICD-10-CM

## 2025-07-27 DIAGNOSIS — M62.81: ICD-10-CM

## 2025-07-27 DIAGNOSIS — G20.B1: ICD-10-CM

## 2025-07-27 DIAGNOSIS — Z79.01: ICD-10-CM

## 2025-07-27 DIAGNOSIS — I71.21: ICD-10-CM

## 2025-07-27 DIAGNOSIS — R47.81: ICD-10-CM

## 2025-07-27 LAB
ALBUMIN SERPL-MCNC: 3.8 G/DL (ref 3.4–5)
ALBUMIN/GLOB SERPL: 1.4 {RATIO} (ref 1.2–2.2)
ALP SERPL-CCNC: 97 U/L (ref 46–116)
ALT SERPL-CCNC: 31 U/L (ref 12–78)
AMORPH SED URNS QL MICRO: (no result)
ANION GAP SERPL CALC-SCNC: 14.4 MMOL/L (ref 5–14)
APPEARANCE UR: CLEAR
AST SERPL-CCNC: 24 U/L (ref 15–37)
BACTERIA URNS QL MICRO: (no result)
BASOPHILS # BLD AUTO: 0.06 K/UL (ref 0–0.1)
BASOPHILS NFR BLD AUTO: 0.7 % (ref 0.1–1.3)
BILIRUB SERPL-MCNC: 0.7 MG/DL (ref 0.2–1)
BILIRUB UR STRIP-MCNC: NEGATIVE MG/DL
BUN SERPL-MCNC: 15 MG/DL (ref 7–18)
BUN/CREAT SERPL: (no result)
CALCIUM SERPL-MCNC: 8.6 MG/DL (ref 8.5–10.1)
CHLORIDE SERPL-SCNC: 100 MMOL/L (ref 100–108)
CO2 SERPL-SCNC: 26 MMOL/L (ref 21–32)
COLOR UR: YELLOW
CREAT CL 24H UR+SERPL-VRATE: 60.75 ML/MIN
CREAT SERPL-MCNC: 1.1 MG/DL (ref 0.8–1.3)
EOSINOPHIL # BLD AUTO: 0.24 K/UL (ref 0–0.4)
EOSINOPHIL NFR BLD AUTO: 2.6 % (ref 0–5.4)
GLOBULIN SER-MCNC: 2.7 G/DL (ref 2.3–3.5)
GLUCOSE SERPL-MCNC: 202 MG/DL (ref 74–106)
GLUCOSE UR STRIP-MCNC: NEGATIVE MG/DL
HCT VFR BLD AUTO: 40.2 % (ref 38.4–49.7)
HGB BLD-MCNC: 12.4 G/DL (ref 12.9–16.9)
IMM GRANULOCYTES # BLD: 0.04 K/UL (ref 0–0.23)
IMM GRANULOCYTES NFR BLD: 0.4 % (ref 0–0.7)
INR PPP: 3.6
KETONES UR STRIP-MCNC: 15 MG/DL
LEUKOCYTE ESTERASE UR QL STRIP: NEGATIVE
LYMPHOCYTES # BLD AUTO: 2.01 K/UL (ref 0.8–3.3)
LYMPHOCYTES NFR BLD AUTO: 22.1 % (ref 11.4–47.7)
Lab: (no result) /HPF
MCH RBC QN AUTO: 21.9 PG (ref 31.6–35.5)
MCHC RBC AUTO-ENTMCNC: 30.8 G/DL (ref 31.6–35.5)
MCHC RBC AUTO-ENTMCNC: 71.2 FL (ref 81.4–99)
MONOCYTES # BLD AUTO: 0.64 K/UL (ref 0.2–0.9)
MONOCYTES NFR BLD AUTO: 7 % (ref 3.3–12.6)
MUCOUS THREADS URNS QL MICRO: (no result)
NEUTROPHILS # BLD AUTO: 6.12 K/UL (ref 1–7.6)
NEUTROPHILS NFR BLD AUTO: 67.2 % (ref 40–78.1)
NITRITE UR QL: NEGATIVE
OTHER ELEMENTS URNS MICRO: (no result)
PH UR STRIP: 7 [PH] (ref 5–8)
PLATELET # BLD AUTO: 221 K/UL (ref 130–375)
POTASSIUM SERPL-SCNC: 3.4 MMOL/L (ref 3.6–5.2)
PROT SERPL-MCNC: 6.5 G/DL (ref 6.4–8.2)
PROT UR STRIP-MCNC: (no result) MG/DL
PROTHROMBIN TIME: 34.8 SEC (ref 9.2–10.6)
RBC # BLD AUTO: 5.65 M/UL (ref 4.14–5.76)
RBC # URNS HPF: (no result) /ML (ref 0–5)
RBC UR QL: NEGATIVE
RENAL EPITHELIAL CELLS,URINE: (no result) /HPF
SODIUM SERPL-SCNC: 137 MMOL/L (ref 140–148)
SP GR UR STRIP: 1.02 (ref 1.01–1.03)
SQUAMOUS EPITHELIAL CELLS,UR: (no result) /HPF
TROPONIN I SERPL HS-MCNC: 38.8 PG/ML (ref ?–60.3)
UROBILINOGEN UR STRIP-ACNC: 0.2 EU/DL (ref 0.2–1)
WBC # BLD AUTO: 9.1 K/UL (ref 3.2–11)
WBC UR QL: (no result) (ref 0–5)

## 2025-07-27 PROCEDURE — 85027 COMPLETE CBC AUTOMATED: CPT

## 2025-07-27 PROCEDURE — G0378 HOSPITAL OBSERVATION PER HR: HCPCS

## 2025-07-27 PROCEDURE — 96361 HYDRATE IV INFUSION ADD-ON: CPT

## 2025-07-27 PROCEDURE — 96375 TX/PRO/DX INJ NEW DRUG ADDON: CPT

## 2025-07-27 PROCEDURE — 96365 THER/PROPH/DIAG IV INF INIT: CPT

## 2025-07-27 PROCEDURE — 80053 COMPREHEN METABOLIC PANEL: CPT

## 2025-07-27 PROCEDURE — 36415 COLL VENOUS BLD VENIPUNCTURE: CPT

## 2025-07-27 PROCEDURE — 70450 CT HEAD/BRAIN W/O DYE: CPT

## 2025-07-27 PROCEDURE — 97162 PT EVAL MOD COMPLEX 30 MIN: CPT

## 2025-07-27 PROCEDURE — 70496 CT ANGIOGRAPHY HEAD: CPT

## 2025-07-27 PROCEDURE — 82947 ASSAY GLUCOSE BLOOD QUANT: CPT

## 2025-07-27 PROCEDURE — 85610 PROTHROMBIN TIME: CPT

## 2025-07-27 PROCEDURE — 96366 THER/PROPH/DIAG IV INF ADDON: CPT

## 2025-07-27 PROCEDURE — 70498 CT ANGIOGRAPHY NECK: CPT

## 2025-07-27 PROCEDURE — 96376 TX/PRO/DX INJ SAME DRUG ADON: CPT

## 2025-07-27 PROCEDURE — 80048 BASIC METABOLIC PNL TOTAL CA: CPT

## 2025-07-27 PROCEDURE — 71045 X-RAY EXAM CHEST 1 VIEW: CPT

## 2025-07-27 PROCEDURE — 85025 COMPLETE CBC W/AUTO DIFF WBC: CPT

## 2025-07-27 PROCEDURE — 83605 ASSAY OF LACTIC ACID: CPT

## 2025-07-27 PROCEDURE — 81001 URINALYSIS AUTO W/SCOPE: CPT

## 2025-07-27 PROCEDURE — 84484 ASSAY OF TROPONIN QUANT: CPT

## 2025-07-27 PROCEDURE — 74177 CT ABD & PELVIS W/CONTRAST: CPT

## 2025-07-27 PROCEDURE — 99285 EMERGENCY DEPT VISIT HI MDM: CPT

## 2025-07-27 RX ADMIN — HYDRALAZINE HYDROCHLORIDE ONE MG: 20 INJECTION INTRAMUSCULAR; INTRAVENOUS at 22:59

## 2025-07-27 RX ADMIN — NICARDIPINE HYDROCHLORIDE SCH MLS/HR: 2.5 INJECTION INTRAVENOUS at 23:50

## 2025-07-27 RX ADMIN — IOPAMIDOL SCH ML: 755 INJECTION, SOLUTION INTRAVENOUS at 23:59

## 2025-07-28 LAB
ANION GAP SERPL CALC-SCNC: 13.1 MMOL/L (ref 5–14)
BUN SERPL-MCNC: 13 MG/DL (ref 7–18)
CALCIUM SERPL-MCNC: 8.9 MG/DL (ref 8.5–10.1)
CHLORIDE SERPL-SCNC: 103 MMOL/L (ref 100–108)
CO2 SERPL-SCNC: 24 MMOL/L (ref 21–32)
CREAT CL 24H UR+SERPL-VRATE: 66.82 ML/MIN
CREAT SERPL-MCNC: 1 MG/DL (ref 0.8–1.3)
GLUCOSE SERPL-MCNC: 161 MG/DL (ref 74–106)
HCT VFR BLD AUTO: 40.6 % (ref 38.4–49.7)
HGB BLD-MCNC: 12.7 G/DL (ref 12.9–16.9)
MCH RBC QN AUTO: 21.7 PG (ref 31.6–35.5)
MCHC RBC AUTO-ENTMCNC: 31.3 G/DL (ref 31.6–35.5)
MCHC RBC AUTO-ENTMCNC: 69.3 FL (ref 81.4–99)
PLATELET # BLD AUTO: 212 K/UL (ref 130–375)
POTASSIUM SERPL-SCNC: 3.9 MMOL/L (ref 3.6–5.2)
RBC # BLD AUTO: 5.86 M/UL (ref 4.14–5.76)
SODIUM SERPL-SCNC: 140 MMOL/L (ref 140–148)
WBC # BLD AUTO: 12.2 K/UL (ref 3.2–11)

## 2025-07-28 RX ADMIN — CLOPIDOGREL BISULFATE ONE MG: 75 TABLET, FILM COATED ORAL at 02:13

## 2025-07-28 RX ADMIN — INSULIN LISPRO SCH UNIT: 100 INJECTION, SOLUTION INTRAVENOUS; SUBCUTANEOUS at 08:19

## 2025-07-28 RX ADMIN — SODIUM CHLORIDE SCH MLS/HR: 0.9 INJECTION, SOLUTION INTRAVENOUS at 07:29

## 2025-07-28 RX ADMIN — MELATONIN TAB 3 MG SCH MG: 3 TAB at 18:27

## 2025-07-28 RX ADMIN — LABETALOL HCL SCH MG: 100 TABLET, FILM COATED ORAL at 11:39

## 2025-07-28 RX ADMIN — CLOPIDOGREL BISULFATE SCH MG: 75 TABLET, FILM COATED ORAL at 08:31

## 2025-07-28 RX ADMIN — PANTOPRAZOLE SODIUM SCH MG: 40 TABLET, DELAYED RELEASE ORAL at 08:31

## 2025-07-28 RX ADMIN — Medication PRN ML: at 00:00

## 2025-07-28 RX ADMIN — ACETAMINOPHEN PRN MG: 325 TABLET, FILM COATED ORAL at 13:20

## 2025-07-28 RX ADMIN — QUETIAPINE SCH MG: 25 TABLET ORAL at 21:01

## 2025-07-28 RX ADMIN — WARFARIN SODIUM ONE MG: 5 TABLET ORAL at 12:53

## 2025-07-28 RX ADMIN — FLUTICASONE PROPIONATE SCH SPRAY: 50 SPRAY, METERED NASAL at 16:55

## 2025-07-28 RX ADMIN — HYDRALAZINE HYDROCHLORIDE ONE MG: 20 INJECTION INTRAMUSCULAR; INTRAVENOUS at 03:12

## 2025-07-28 RX ADMIN — LOSARTAN POTASSIUM SCH: 25 TABLET, FILM COATED ORAL at 11:47

## 2025-07-28 RX ADMIN — ROSUVASTATIN CALCIUM SCH MG: 10 TABLET, FILM COATED ORAL at 21:01

## 2025-07-28 RX ADMIN — SODIUM CHLORIDE ONE MLS/SEC: 9 INJECTION, SOLUTION INTRAVENOUS at 16:42

## 2025-07-28 RX ADMIN — HYDROCODONE BITARTRATE AND ACETAMINOPHEN SCH TAB: 5; 325 TABLET ORAL at 21:01

## 2025-07-28 RX ADMIN — IOPAMIDOL ONE ML: 612 INJECTION, SOLUTION INTRAVENOUS at 16:42

## 2025-07-28 RX ADMIN — SODIUM CHLORIDE SCH MLS/HR: 0.9 INJECTION, SOLUTION INTRAVENOUS at 03:15

## 2025-07-28 RX ADMIN — HYDRALAZINE HYDROCHLORIDE PRN MG: 20 INJECTION INTRAMUSCULAR; INTRAVENOUS at 07:13

## 2025-07-28 RX ADMIN — SODIUM CHLORIDE SCH MLS/SEC: 9 INJECTION, SOLUTION INTRAVENOUS at 00:00

## 2025-07-28 RX ADMIN — TAMSULOSIN HYDROCHLORIDE SCH MG: 0.4 CAPSULE ORAL at 21:01

## 2025-07-29 VITALS — SYSTOLIC BLOOD PRESSURE: 117 MMHG | HEART RATE: 98 BPM | DIASTOLIC BLOOD PRESSURE: 68 MMHG

## 2025-07-29 LAB
HCT VFR BLD AUTO: 35.8 % (ref 38.4–49.7)
HGB BLD-MCNC: 11.2 G/DL (ref 12.9–16.9)
INR PPP: 2
MCH RBC QN AUTO: 21.9 PG (ref 31.6–35.5)
MCHC RBC AUTO-ENTMCNC: 31.3 G/DL (ref 31.6–35.5)
MCHC RBC AUTO-ENTMCNC: 70.1 FL (ref 81.4–99)
PLATELET # BLD AUTO: 182 K/UL (ref 130–375)
PROTHROMBIN TIME: 19.8 SEC (ref 9.2–10.6)
RBC # BLD AUTO: 5.11 M/UL (ref 4.14–5.76)
WBC # BLD AUTO: 8.5 K/UL (ref 3.2–11)

## 2025-07-29 RX ADMIN — Medication SCH EACH: at 09:33

## 2025-07-29 RX ADMIN — PRAMIPEXOLE DIHYDROCHLORIDE SCH MG: 0.5 TABLET ORAL at 11:23

## 2025-07-29 RX ADMIN — QUETIAPINE SCH MG: 25 TABLET ORAL at 09:36

## 2025-07-29 RX ADMIN — HALOPERIDOL ONE MG: 5 TABLET ORAL at 03:00

## 2025-07-29 RX ADMIN — SENNOSIDES AND DOCUSATE SODIUM PRN TAB: 8.6; 5 TABLET ORAL at 09:35
